# Patient Record
Sex: MALE | Race: WHITE | NOT HISPANIC OR LATINO | Employment: PART TIME | URBAN - METROPOLITAN AREA
[De-identification: names, ages, dates, MRNs, and addresses within clinical notes are randomized per-mention and may not be internally consistent; named-entity substitution may affect disease eponyms.]

---

## 2017-04-11 PROBLEM — R06.02 SOB (SHORTNESS OF BREATH): Status: ACTIVE | Noted: 2017-04-11

## 2017-04-11 PROBLEM — R68.82 DECREASED LIBIDO: Status: ACTIVE | Noted: 2017-04-11

## 2017-04-11 PROBLEM — R53.83 FATIGUE: Status: ACTIVE | Noted: 2017-04-11

## 2023-03-15 ENCOUNTER — TELEPHONE (OUTPATIENT)
Dept: SLEEP MEDICINE | Facility: CLINIC | Age: 43
End: 2023-03-15

## 2023-03-15 NOTE — TELEPHONE ENCOUNTER
----- Message from Ana Hathaway sent at 3/15/2023 11:08 AM CDT -----  Regarding: CALL BACK  Type: Patient Call Back    Who called: SHAAN MARTINEZ [[id number]]    What is the request in detail: PT stated that he will be paying out of pocket so he would like to schedule an appt with the office. I verbalized he needed an referral and the patient stated he did not currently have an PCP.Please contact to further discuss and advise.     Can the clinic reply by [facility]? NO    Would the patient rather a call back or a response via [facility]?   CALL BACK    Best call back number: [phone]    Additional Information:  N/A

## 2024-06-28 ENCOUNTER — TELEPHONE (OUTPATIENT)
Dept: SLEEP MEDICINE | Facility: CLINIC | Age: 44
End: 2024-06-28
Payer: COMMERCIAL

## 2024-06-28 NOTE — TELEPHONE ENCOUNTER
Patient stated they found another sleep provider        ----- Message from Lady Francisco sent at 6/28/2024 11:20 AM CDT -----  Type: Sooner Appointment        Patient is requesting a sooner appointment. Patient declined first available appointment listed as well as another facility and provider. Patient will not accept being placed on the wait list and is requesting a message be sent to the doctor.        Name of caller:SHAAN MARTINEZ [[id number]]        When is the first available appointment? 9/30/2024        Symptoms: restless sleeping and trouble breathing         Would the patient rather a call back or response via [facility]?        Best call back Number: [phone]

## 2024-07-25 ENCOUNTER — TELEPHONE (OUTPATIENT)
Dept: PULMONOLOGY | Facility: CLINIC | Age: 44
End: 2024-07-25
Payer: COMMERCIAL

## 2024-07-25 DIAGNOSIS — R06.02 SOB (SHORTNESS OF BREATH): Primary | ICD-10-CM

## 2024-07-25 NOTE — TELEPHONE ENCOUNTER
----- Message from Abe Akhtar sent at 7/25/2024  1:09 PM CDT -----  Regarding: appt  Contact: [phone]  Pt calling to request new pt appt, SOB, hard to take deep breaths. Attempted to schedule. Please call [phone]

## 2024-07-25 NOTE — TELEPHONE ENCOUNTER
Spoke with pt, informed him that I have received his message. I also advised pt that I can schedule his appointment with Dr Gutierres on 8/9/24 for 3:00 pm with Pft prior beginning at 2:15 pm. Pt verbalized that he understand and accepted appointment.